# Patient Record
Sex: FEMALE | Race: BLACK OR AFRICAN AMERICAN
[De-identification: names, ages, dates, MRNs, and addresses within clinical notes are randomized per-mention and may not be internally consistent; named-entity substitution may affect disease eponyms.]

---

## 2021-07-31 ENCOUNTER — HOSPITAL ENCOUNTER (EMERGENCY)
Dept: HOSPITAL 5 - ED | Age: 66
Discharge: HOME | End: 2021-07-31
Payer: COMMERCIAL

## 2021-07-31 VITALS — SYSTOLIC BLOOD PRESSURE: 152 MMHG | DIASTOLIC BLOOD PRESSURE: 87 MMHG

## 2021-07-31 DIAGNOSIS — Z90.49: ICD-10-CM

## 2021-07-31 DIAGNOSIS — R50.9: ICD-10-CM

## 2021-07-31 DIAGNOSIS — R42: ICD-10-CM

## 2021-07-31 DIAGNOSIS — R06.02: Primary | ICD-10-CM

## 2021-07-31 DIAGNOSIS — F17.200: ICD-10-CM

## 2021-07-31 DIAGNOSIS — Z79.899: ICD-10-CM

## 2021-07-31 LAB
ALBUMIN SERPL-MCNC: 4.6 G/DL (ref 3.9–5)
ALT SERPL-CCNC: 12 UNITS/L (ref 7–56)
BASOPHILS # (AUTO): 0 K/MM3 (ref 0–0.1)
BASOPHILS NFR BLD AUTO: 0.5 % (ref 0–1.8)
BILIRUB UR QL STRIP: (no result)
BLOOD UR QL VISUAL: (no result)
BUN SERPL-MCNC: 12 MG/DL (ref 7–17)
BUN/CREAT SERPL: 24 %
CALCIUM SERPL-MCNC: 9.6 MG/DL (ref 8.4–10.2)
EOSINOPHIL # BLD AUTO: 0 K/MM3 (ref 0–0.4)
EOSINOPHIL NFR BLD AUTO: 0.6 % (ref 0–4.3)
HCT VFR BLD CALC: 43.9 % (ref 30.3–42.9)
HEMOLYSIS INDEX: 8
HGB BLD-MCNC: 15.1 GM/DL (ref 10.1–14.3)
LYMPHOCYTES # BLD AUTO: 1.6 K/MM3 (ref 1.2–5.4)
LYMPHOCYTES NFR BLD AUTO: 28.9 % (ref 13.4–35)
MCHC RBC AUTO-ENTMCNC: 34 % (ref 30–34)
MCV RBC AUTO: 90 FL (ref 79–97)
MONOCYTES # (AUTO): 0.5 K/MM3 (ref 0–0.8)
MONOCYTES % (AUTO): 8.1 % (ref 0–7.3)
PH UR STRIP: 7 [PH] (ref 5–7)
PLATELET # BLD: 163 K/MM3 (ref 140–440)
PROT UR STRIP-MCNC: (no result) MG/DL
RBC # BLD AUTO: 4.87 M/MM3 (ref 3.65–5.03)
RBC #/AREA URNS HPF: 1 /HPF (ref 0–6)
T4 FREE SERPL-MCNC: 1.53 NG/DL (ref 0.76–1.46)
UROBILINOGEN UR-MCNC: < 2 MG/DL (ref ?–2)
WBC #/AREA URNS HPF: < 1 /HPF (ref 0–6)

## 2021-07-31 PROCEDURE — 70450 CT HEAD/BRAIN W/O DYE: CPT

## 2021-07-31 PROCEDURE — 84439 ASSAY OF FREE THYROXINE: CPT

## 2021-07-31 PROCEDURE — 96360 HYDRATION IV INFUSION INIT: CPT

## 2021-07-31 PROCEDURE — 85025 COMPLETE CBC W/AUTO DIFF WBC: CPT

## 2021-07-31 PROCEDURE — 93005 ELECTROCARDIOGRAM TRACING: CPT

## 2021-07-31 PROCEDURE — 85379 FIBRIN DEGRADATION QUANT: CPT

## 2021-07-31 PROCEDURE — 84484 ASSAY OF TROPONIN QUANT: CPT

## 2021-07-31 PROCEDURE — 80053 COMPREHEN METABOLIC PANEL: CPT

## 2021-07-31 PROCEDURE — 84443 ASSAY THYROID STIM HORMONE: CPT

## 2021-07-31 PROCEDURE — 71046 X-RAY EXAM CHEST 2 VIEWS: CPT

## 2021-07-31 PROCEDURE — 99285 EMERGENCY DEPT VISIT HI MDM: CPT

## 2021-07-31 PROCEDURE — 81001 URINALYSIS AUTO W/SCOPE: CPT

## 2021-07-31 PROCEDURE — 36415 COLL VENOUS BLD VENIPUNCTURE: CPT

## 2021-07-31 NOTE — CAT SCAN REPORT
CT head/brain wo con 



INDICATION / CLINICAL INFORMATION:

Headache, dizziness.



TECHNIQUE:

Axial CT imaging of the brain was obtained without contrast. Coronal and sagittal reformatted imaging
 obtained and reviewed.  All CT scans at this location are performed using CT dose reduction for ALAR
A by means of automated exposure control. 



COMPARISON:

None available.



FINDINGS:

CT imaging of the brain does not demonstrate any intracranial mass, hemorrhage, or midline shift. No 
extra axial fluid collection or suggestion of acute territorial infarction. Ventricular system and ba
silar cisterns are unremarkable. There is benign calcification noted within the basal ganglia bilater
ally. There is mild cerebral and cerebellar atrophy, age appropriate.



The visualized paranasal sinuses and mastoid air cells are well aerated and clear. No calvarial abnor
mality noted.



IMPRESSION:

1. No acute intracranial abnormality. 



Signer Name: Nicky Coates MD 

Signed: 7/31/2021 12:27 PM

Workstation Name: [id number]

## 2021-07-31 NOTE — XRAY REPORT
CHEST 2 VIEWS 



INDICATION / CLINICAL INFORMATION:

Cough.



COMPARISON: 

None available.



FINDINGS:



SUPPORT DEVICES: None.



HEART / MEDIASTINUM: No significant abnormality. 



LUNGS / PLEURA: The lungs are borderline hyperinflated but otherwise grossly clear. No suggestion for
 pneumonia, pleural effusion, or pulmonary edema. No pneumothorax. 



ADDITIONAL FINDINGS: No significant additional findings.



IMPRESSION:

1. No acute pulmonary or pleural disease.



Signer Name: Nicky Coates MD 

Signed: 7/31/2021 10:57 AM

Workstation Name: [id number]

## 2021-07-31 NOTE — EMERGENCY DEPARTMENT REPORT
HPI





- General


Chief Complaint: Weakness


Time Seen by Provider: 21 11:25





- HPI


HPI: 





Room 33








The patient is a 58-year-old female present with chief complaint of shortness of

breath and dizziness.  The patient states for the past 4 days she has had s

hortness of breath and subjective fever.  Patient denies history of cough.  

Patient complains of pain in both feet and a headache.  Patient also states she 

feels dizzy at times.  Patient complains of feeling fatigued but denies chest 

pain/chest pressure/chest tightness





ED Past Medical Hx





- Past Medical History


Previous Medical History?: Yes


Additional medical history: Bronchitis





- Surgical History


Past Surgical History?: Yes


Hx Cholecystectomy: Yes





- Family History


Family history: no significant





- Social History


Smoking Status: Current Every Day Smoker (1/2 pack/day)


Substance Use Type: None





- Medications


Home Medications: 


                                Home Medications











 Medication  Instructions  Recorded  Confirmed  Last Taken  Type


 


Albuterol Mdi (or & Nicu Only) 2 puff IH QID PRN #8.5 gram 21  Unknown Rx





[ProAir HFA Inhaler]     


 


Azithromycin [Zithromax Z-ARTHUR] 0 mg PO DAILY #6 tab 21  Unknown Rx


 


Meclizine [Antivert] 25 mg PO TID PRN #20 tablet 21  Unknown Rx


 


Prednisone [predniSONE 10 mg 10 mg PO .TAPER #1 tab.ds.pk 21  Unknown Rx





(6-Day Pack, 21 Tabs)]     














ED Review of Systems


ROS: 


Stated complaint: FEELING WEAK, DIFFICULT WALKING


Other details as noted in HPI





Constitutional: fever (Subjective), malaise


Eyes: denies: eye pain


ENT: denies: throat pain


Respiratory: shortness of breath.  denies: cough


Cardiovascular: denies: chest pain


Endocrine: no symptoms reported


Gastrointestinal: denies: nausea, vomiting


Genitourinary: denies: dysuria


Musculoskeletal: denies: back pain


Neurological: headache





Physical Exam





- Physical Exam


Vital Signs: 


                                   Vital Signs











  21





  09:06


 


Temperature 98.6 F


 


Pulse Rate 74


 


Respiratory 18





Rate 


 


Blood Pressure 152/87


 


O2 Sat by Pulse 99





Oximetry 











Physical Exam: 





GENERAL: The patient is well-developed well-nourished female sitting in chair 

not appearing to be in acute distress. []


HEENT: Normocephalic.  Atraumatic.  Extraocular motions are intact.  Patient has

 moist mucous membranes.


NECK: Supple.  No meningitic signs are noted.  Trachea midline


CHEST/LUNGS: Clear to auscultation.  There is no respiratory distress noted.


HEART/CARDIOVASCULAR: Regular.  There is no tachycardia.  There is no gallop rub

 or murmur.


ABDOMEN: Abdomen is soft, nontender.  Patient has normal bowel sounds.  There is

 no abdominal distention.


SKIN: There is no rash.  There is no edema.  There is no diaphoresis.


NEURO: The patient is awake, alert, and oriented.  The patient is cooperative.  

The patient has no focal neurologic deficits.  The patient has normal speech.  

Cranial nerves II through XII grossly intact.  GCS 15


MUSCULOSKELETAL:  There is no evidence of acute injury.





ED Course


                                   Vital Signs











  21





  09:06


 


Temperature 98.6 F


 


Pulse Rate 74


 


Respiratory 18





Rate 


 


Blood Pressure 152/87


 


O2 Sat by Pulse 99





Oximetry 














ED Medical Decision Making





- Lab Data


Result diagrams: 


                                 21 10:43





                                 21 10:43





                                Laboratory Tests











  21





  10:43 10:43 10:43


 


WBC  5.6  


 


RBC  4.87  


 


Hgb  15.1 H  


 


Hct  43.9 H  


 


MCV  90  


 


MCH  31  


 


MCHC  34  


 


RDW  13.6  


 


Plt Count  163  


 


Lymph % (Auto)  28.9  


 


Mono % (Auto)  8.1 H  


 


Eos % (Auto)  0.6  


 


Baso % (Auto)  0.5  


 


Lymph # (Auto)  1.6  


 


Mono # (Auto)  0.5  


 


Eos # (Auto)  0.0  


 


Baso # (Auto)  0.0  


 


Seg Neutrophils %  61.9  


 


Seg Neutrophils #  3.5  


 


D-Dimer    < 135.00


 


Sodium   138 


 


Potassium   4.5 


 


Chloride   99.9 


 


Carbon Dioxide   30 


 


Anion Gap   13 


 


BUN   12 


 


Creatinine   0.5 L 


 


Estimated GFR   > 60 


 


BUN/Creatinine Ratio   24 


 


Glucose   114 H 


 


Calcium   9.6 


 


Total Bilirubin   0.40 


 


AST   26 


 


ALT   12 


 


Alkaline Phosphatase   93 


 


Troponin T   < 0.010 


 


Total Protein   7.5 


 


Albumin   4.6 


 


Albumin/Globulin Ratio   1.6 


 


TSH   


 


Free T4   


 


Urine Color   


 


Urine Turbidity   


 


Urine pH   


 


Ur Specific Gravity   


 


Urine Protein   


 


Urine Glucose (UA)   


 


Urine Ketones   


 


Urine Blood   


 


Urine Nitrite   


 


Urine Bilirubin   


 


Urine Urobilinogen   


 


Ur Leukocyte Esterase   


 


Urine WBC (Auto)   


 


Urine RBC (Auto)   














  21





  10:43 12:45


 


WBC  


 


RBC  


 


Hgb  


 


Hct  


 


MCV  


 


MCH  


 


MCHC  


 


RDW  


 


Plt Count  


 


Lymph % (Auto)  


 


Mono % (Auto)  


 


Eos % (Auto)  


 


Baso % (Auto)  


 


Lymph # (Auto)  


 


Mono # (Auto)  


 


Eos # (Auto)  


 


Baso # (Auto)  


 


Seg Neutrophils %  


 


Seg Neutrophils #  


 


D-Dimer  


 


Sodium  


 


Potassium  


 


Chloride  


 


Carbon Dioxide  


 


Anion Gap  


 


BUN  


 


Creatinine  


 


Estimated GFR  


 


BUN/Creatinine Ratio  


 


Glucose  


 


Calcium  


 


Total Bilirubin  


 


AST  


 


ALT  


 


Alkaline Phosphatase  


 


Troponin T  


 


Total Protein  


 


Albumin  


 


Albumin/Globulin Ratio  


 


TSH  0.820 


 


Free T4  1.53 H 


 


Urine Color   Straw


 


Urine Turbidity   Clear


 


Urine pH   7.0


 


Ur Specific Gravity   1.006


 


Urine Protein   <15 mg/dl


 


Urine Glucose (UA)   Neg


 


Urine Ketones   Neg


 


Urine Blood   Neg


 


Urine Nitrite   Neg


 


Urine Bilirubin   Neg


 


Urine Urobilinogen   < 2.0


 


Ur Leukocyte Esterase   Neg


 


Urine WBC (Auto)   < 1.0


 


Urine RBC (Auto)   1.0














- EKG Data


-: EKG Interpreted by Me


EKG shows normal: sinus rhythm


Rate: normal





- EKG Data


When compared to previous EKG there are: previous EKG unavailable


Interpretation: other (No ischemic changes seen)





- Radiology Data


Radiology results: report reviewed (CT head, chest x-ray), image reviewed (CT 

head, chest x-ray)


interpreted by me: 





Chest x-ray-no definite focal infiltrates, no pneumothorax





[facility] [address] [place] [address] Cat

 Scan Report Signed Patient: GABBI TOLENTINO MR#: [id number] : 1955 

Acct:[id number] Age/Sex: 66 / F ADM Date: 21 Loc: ED Attending Dr: 

Ordering Physician: MAHESH HAMMONDS MD Date of Service: 21 Procedure(s): CT 

head/brain wo con Accession Number(s): [id number] cc: MAHESH HAMMONDS MD CT head/brain

 wo con INDICATION / CLINICAL INFORMATION: Headache, dizziness. TECHNIQUE: Axial

 CT imaging of the brain was obtained without contrast. Coronal and sagittal 

reformatted imaging obtained and reviewed. All CT scans at this location are 

performed using CT dose reduction for ALARA by means of automated exposure 

control. COMPARISON: None available. FINDINGS: CT imaging of the brain does not 

demonstrate any intracranial mass, hemorrhage, or midline shift. No extra axial 

fluid collection or suggestion of acute territorial infarction. Ventricular 

system and basilar cisterns are unremarkable. There is benign calcification 

noted within the basal ganglia bilaterally. There is mild cerebral and ce

rebellar atrophy, age appropriate. The visualized paranasal sinuses and mastoid 

air cells are well aerated and clear. No calvarial abnormality noted. 

IMPRESSION: 1. No acute intracranial abnormality. Signer Name: Nicky Coates MD Signed: 2021 12:27 PM Workstation Name: [id number] Transcribed By:  

Dictated By: Nicky Coates MD Electronically Authenticated By: Nicky Coates MD Signed Date/Time: 21 1227 DD/DT: 21 1225 TD/TT: 


Print Cancel





[facility] [address] [place] [address] 

XRay Report Signed Patient: GABBI TOLENTINO MR#: [id number] : 1955 

Acct:[id number] Age/Sex: 66 / F ADM Date: 21 Loc: ED Attending Dr: 

Ordering Physician: LIVIER MARINELLI MD Date of Service: 21 Procedure(s): XR chest 

routine 2V Accession Number(s): [id number] cc: ED MD [facility] Time In Minutes: 

CHEST 2 VIEWS INDICATION / CLINICAL INFORMATION: Cough. COMPARISON: None 

available. FINDINGS: SUPPORT DEVICES: None. HEART / MEDIASTINUM: No significant 

abnormality. LUNGS / PLEURA: The lungs are borderline hyperinflated but 

otherwise grossly clear. No suggestion for pneumonia, pleural effusion, or 

pulmonary edema. No pneumothorax. ADDITIONAL FINDINGS: No significant additional

 findings. IMPRESSION: 1. No acute pulmonary or pleural disease. Signer Name: 

Nicky Coates MD Signed: 2021 10:57 AM Workstation Name: [id number] 

Transcribed By:  Dictated By: Nicky Coates MD Electronically Authent

icated By: Nicky Coates MD Signed Date/Time: 21 105 DD/DT: 

21 1056 TD/TT: 


Print Cancel 











- Differential Diagnosis


Symptomatic anemia, hypothyroidism, PE, COVID-19, COPD,


Critical care attestation.: 


If time is entered above; I have spent that time in minutes in the direct care 

of this critically ill patient, excluding procedure time.








ED Disposition


Clinical Impression: 


 Shortness of breath, Vertigo, Subjective fever





Disposition: DC-01 TO HOME OR SELFCARE


Is pt being admited?: No


Does the pt Need Aspirin: No


Condition: Stable


Instructions:  Shortness of Breath, Adult, Easy-to-Read, Dizziness, Easy-to-Read


Additional Instructions: 


Return to the emergency department should you develop worsening symptoms, 

inability to tolerate food or liquids, high fever or any other concerns


Prescriptions: 


Meclizine [Antivert] 25 mg PO TID PRN #20 tablet


 PRN Reason: Vertigo


Prednisone [predniSONE 10 mg (6-Day Pack, 21 Tabs)] 10 mg PO .TAPER #1 tab.ds.pk


Albuterol Mdi (or & Nicu Only) [ProAir HFA Inhaler] 2 puff IH QID PRN #8.5 gram


 PRN Reason: Shortness Of Breath


Azithromycin [Zithromax Z-ARTHUR] 0 mg PO DAILY #6 tab


Referrals: 


PRIMARY CARE,MD [Referring] - 3-5 Days

## 2021-08-02 NOTE — ELECTROCARDIOGRAPH REPORT
[facility]

                                       

Test Date:    2021               Test Time:    12:23:31

Pat Name:     GABBI TOLENTINO                 Department:   

Patient ID:   [id number]          Room:          

Gender:       F                        Technician:   BEATRIZ

:          1955               Requested By: ROSSY BUTLER

Order Number: [id number]              Reading MD:   Geoff Heredia

                                 Measurements

Intervals                              Axis          

Rate:         74                       P:            80

CO:           146                      QRS:          80

QRSD:         91                       T:            32

QT:           407                                    

QTc:          452                                    

                           Interpretive Statements

Sinus rhythm

No previous ECG available for comparison

Electronically Signed On 2021 10:37:33 EDT by Geoff Heredia

## 2022-08-06 ENCOUNTER — HOSPITAL ENCOUNTER (EMERGENCY)
Dept: HOSPITAL 5 - ED | Age: 67
Discharge: HOME | End: 2022-08-06
Payer: COMMERCIAL

## 2022-08-06 VITALS — DIASTOLIC BLOOD PRESSURE: 86 MMHG | SYSTOLIC BLOOD PRESSURE: 113 MMHG

## 2022-08-06 DIAGNOSIS — Z90.49: ICD-10-CM

## 2022-08-06 DIAGNOSIS — R53.83: Primary | ICD-10-CM

## 2022-08-06 LAB
ALBUMIN SERPL-MCNC: 4.2 G/DL (ref 3.9–5)
ALT SERPL-CCNC: 7 UNITS/L (ref 7–56)
BASOPHILS # (AUTO): 0.1 K/MM3 (ref 0–0.1)
BASOPHILS NFR BLD AUTO: 1.6 % (ref 0–1.8)
BILIRUB UR QL STRIP: (no result)
BLOOD UR QL VISUAL: (no result)
BUN SERPL-MCNC: 15 MG/DL (ref 7–17)
BUN/CREAT SERPL: 21 %
CALCIUM SERPL-MCNC: 9 MG/DL (ref 8.4–10.2)
EOSINOPHIL # BLD AUTO: 0.1 K/MM3 (ref 0–0.4)
EOSINOPHIL NFR BLD AUTO: 1.2 % (ref 0–4.3)
HCT VFR BLD CALC: 41.7 % (ref 30.3–42.9)
HEMOLYSIS INDEX: 31
HGB BLD-MCNC: 14.1 GM/DL (ref 10.1–14.3)
LYMPHOCYTES # BLD AUTO: 1.7 K/MM3 (ref 1.2–5.4)
LYMPHOCYTES NFR BLD AUTO: 37 % (ref 13.4–35)
MCHC RBC AUTO-ENTMCNC: 34 % (ref 30–34)
MCV RBC AUTO: 89 FL (ref 79–97)
MONOCYTES # (AUTO): 0.3 K/MM3 (ref 0–0.8)
MONOCYTES % (AUTO): 7.1 % (ref 0–7.3)
PH UR STRIP: 8 [PH] (ref 5–7)
PLATELET # BLD: 144 K/MM3 (ref 140–440)
PROT UR STRIP-MCNC: (no result) MG/DL
RBC # BLD AUTO: 4.7 M/MM3 (ref 3.65–5.03)
RBC #/AREA URNS HPF: 1 /HPF (ref 0–6)
UROBILINOGEN UR-MCNC: < 2 MG/DL (ref ?–2)
WBC #/AREA URNS HPF: < 1 /HPF (ref 0–6)

## 2022-08-06 PROCEDURE — 99284 EMERGENCY DEPT VISIT MOD MDM: CPT

## 2022-08-06 PROCEDURE — 84443 ASSAY THYROID STIM HORMONE: CPT

## 2022-08-06 PROCEDURE — 85025 COMPLETE CBC W/AUTO DIFF WBC: CPT

## 2022-08-06 PROCEDURE — 84484 ASSAY OF TROPONIN QUANT: CPT

## 2022-08-06 PROCEDURE — 71046 X-RAY EXAM CHEST 2 VIEWS: CPT

## 2022-08-06 PROCEDURE — 81001 URINALYSIS AUTO W/SCOPE: CPT

## 2022-08-06 PROCEDURE — 80053 COMPREHEN METABOLIC PANEL: CPT

## 2022-08-06 PROCEDURE — 36415 COLL VENOUS BLD VENIPUNCTURE: CPT

## 2022-08-06 PROCEDURE — 99283 EMERGENCY DEPT VISIT LOW MDM: CPT

## 2022-08-06 PROCEDURE — 96360 HYDRATION IV INFUSION INIT: CPT

## 2022-08-06 NOTE — EMERGENCY DEPARTMENT REPORT
ED General Adult HPI





- General


Chief complaint: Medical Clearance


Stated complaint: LOW BLOOD PRESSURE


Time Seen by Provider: 08/06/22 13:35


Source: patient


Mode of arrival: Ambulatory


Limitations: No Limitations





- History of Present Illness


Initial comments: 





67-year-old  female with no past medical history presents to the emergency 

department for evaluation of few day history of progressive fatigue.  She states

that she is just not had the energy that she usually has for the last few days 

and when she checked her blood pressure at home this morning it was low.  She 

denies chest pain, shortness of breath, fever, dizziness, nausea, vomiting, and 

diaphoresis.  She denies any sick contacts.


MD Complaint: Fatigue and low blood pressure


-: Gradual, days(s) (3-4)


Severity scale (0 -10): 0


Associated Symptoms: malaise.  denies: confusion, chest pain, diaphoresis, 

fever/chills, headaches, loss of appetite, nausea/vomiting, rash, seizure, 

shortness of breath, syncope, weakness


Treatments Prior to Arrival: none





- Related Data


                                  Previous Rx's











 Medication  Instructions  Recorded  Last Taken  Type


 


Albuterol Mdi (or & Nicu Only) 2 puff IH QID PRN #8.5 gram 07/31/21 Unknown Rx





[ProAir HFA Inhaler]    


 


Azithromycin [Zithromax Z-ARTHUR] 0 mg PO DAILY #6 tab 07/31/21 Unknown Rx


 


Meclizine [Antivert] 25 mg PO TID PRN #20 tablet 07/31/21 Unknown Rx


 


Prednisone [predniSONE 10 mg 10 mg PO .TAPER #1 tab.ds.pk 07/31/21 Unknown Rx





(6-Day Pack, 21 Tabs)]    











                                    Allergies











Allergy/AdvReac Type Severity Reaction Status Date / Time


 


No Known Allergies Allergy   Verified 08/06/22 11:58














ED Review of Systems


ROS: 


Stated complaint: LOW BLOOD PRESSURE


Other details as noted in HPI





Comment: All other systems reviewed and negative


Constitutional: malaise.  denies: chills, fever


ENT: denies: congestion


Respiratory: denies: cough, shortness of breath, SOB with exertion, SOB at rest,

stridor, wheezing


Cardiovascular: denies: chest pain, palpitations, dyspnea on exertion, 

orthopnea, edema, syncope, paroxysmal nocturnal dyspnea


Gastrointestinal: denies: abdominal pain, nausea, vomiting, diarrhea, 

hematemesis, melena, hematochezia


Genitourinary: denies: urgency, dysuria, frequency, hematuria, discharge


Musculoskeletal: denies: back pain


Skin: denies: rash, lesions


Neurological: weakness.  denies: headache, numbness, paresthesias, confusion, 

abnormal gait, vertigo





ED Past Medical Hx





- Past Medical History


Additional medical history: Bronchitis





- Surgical History


Hx Cholecystectomy: Yes





- Social History


Smoking Status: Never Smoker


Substance Use Type: None





- Medications


Home Medications: 


                                Home Medications











 Medication  Instructions  Recorded  Confirmed  Last Taken  Type


 


Albuterol Mdi (or & Nicu Only) 2 puff IH QID PRN #8.5 gram 07/31/21  Unknown Rx





[ProAir HFA Inhaler]     


 


Azithromycin [Zithromax Z-ARTHUR] 0 mg PO DAILY #6 tab 07/31/21  Unknown Rx


 


Meclizine [Antivert] 25 mg PO TID PRN #20 tablet 07/31/21  Unknown Rx


 


Prednisone [predniSONE 10 mg 10 mg PO .TAPER #1 tab.ds.pk 07/31/21  Unknown Rx





(6-Day Pack, 21 Tabs)]     














ED Physical Exam





- General


Limitations: No Limitations


General appearance: alert, in no apparent distress





- Head


Head exam: Present: atraumatic, normocephalic





- Eye


Eye exam: Present: normal appearance.  Absent: conjunctival injection, 

periorbital swelling, periorbital tenderness





- ENT


ENT exam: Present: normal exam, normal orophraynx





- Neck


Neck exam: Present: normal inspection, full ROM.  Absent: tenderness, 

lymphadenopathy





- Respiratory


Respiratory exam: Present: normal lung sounds bilaterally.  Absent: respiratory 

distress, wheezes, rales, rhonchi, stridor, chest wall tenderness





- Cardiovascular


Cardiovascular Exam: Present: regular rate, normal heart sounds





- GI/Abdominal


GI/Abdominal exam: Present: soft, normal bowel sounds.  Absent: distended, 

tenderness, guarding, rebound, rigid





- Extremities Exam


Extremities exam: Present: normal inspection, full ROM, normal capillary refill.

 Absent: tenderness, pedal edema, joint swelling, calf tenderness





- Back Exam


Back exam: Present: normal inspection.  Absent: CVA tenderness (R), CVA 

tenderness (L), vertebral tenderness





- Neurological Exam


Neurological exam: Present: alert, oriented X3, CN II-XII intact, normal gait, 

reflexes normal.  Absent: motor sensory deficit





- Expanded Neurological Exam


  ** Expanded


Patient oriented to: Present: person, place, time


Speech: Present: fluid speech


Cranial nerves: EOM's Intact: Normal, Gag Reflex: Normal, Tongue Deviation: 

Normal, Nystagmus: Normal, Facial Sensation: Normal


Ataxia: Absent: yes


Cerebellar function: Romberg: Normal


Sensory exam: Upper Extremity Light Touch: Normal, Upper Extremity Temperature: 

Normal, Lower Extremity Light Touch: Normal, Lower Extremity Temperature: Normal


Motor strength exam: RUE: 5, LUE: 5, RLE: 5, LLE: 5


Best Eye Response (Nelson): (4) open spontaneously


Best Motor Response (Nelson): (6) obeys commands


Best Verbal Response (Praveen): (5) oriented


Nelson Total: 15





- Psychiatric


Psychiatric exam: Present: normal affect, normal mood





- Skin


Skin exam: Present: warm, dry, intact, normal color





ED Course


                                   Vital Signs











  08/06/22 08/06/22 08/06/22





  11:57 14:15 16:53


 


Temperature 98.2 F 98.5 F 98.4 F


 


Pulse Rate 90 88 75


 


Respiratory 18 16 16





Rate   


 


Blood Pressure  133/86 


 


Blood Pressure 103/74  113/86





[Left]   


 


O2 Sat by Pulse 96 99 100





Oximetry   














- Reevaluation(s)


Reevaluation #1: 





08/06/22 15:55


Patient states that she feels much better.





ED Medical Decision Making





- Lab Data


Result diagrams: 


                                 08/06/22 12:53





                                 08/06/22 12:53





- Radiology Data


Radiology results: report reviewed, image reviewed





Chest x-ray:


 FINDINGS:  


 


 SUPPORT DEVICES: None.  


 


 HEART: Within normal limits.   


 


 LUNGS/PLEURA: No acute air space or interstitial disease.  No pneumothorax.  


 


 ADDITIONAL FINDINGS: None.  


 


 


 


 IMPRESSION:  


 1. No acute findings.  





- Medical Decision Making








67-year-old  female with no past medical history presents to the emergency 

department for evaluation of few day history of progressive fatigue.  She states

 that she is just not had the energy that she usually has for the last few days 

and when she checked her blood pressure at home this morning it was low.  She 

denies chest pain, shortness of breath, fever, dizziness, nausea, vomiting, and 

diaphoresis.  She denies any sick contacts.





Physical exam unremarkable.  Workup unremarkable.  Patient feels better.  She 

will be discharged home to follow up with her pcp for further evaluation and 

management or return to ED as needed.  She verbalized understanding of and 

agreement with plan of care. 


Critical care attestation.: 


If time is entered above; I have spent that time in minutes in the direct care 

of this critically ill patient, excluding procedure time.








ED Disposition


Clinical Impression: 


Fatigue


Qualifiers:


 Fatigue type: unspecified Qualified Code(s): R53.83 - Other fatigue





Disposition: 01 HOME / SELF CARE / HOMELESS


Is pt being admited?: No


Does the pt Need Aspirin: No


Condition: Stable


Instructions:  Health Maintenance After Age 65, Fatigue


Additional Instructions: 


Get plenty of rest.  Increase intake of noncaffeinated fluids.  Follow-up with 

your primary care provider for further evaluation.  Return to the emergency 

department as needed.


Referrals: 


INDRA RICH MD [Staff Physician] - 3-5 Days


Time of Disposition: 15:56

## 2022-08-06 NOTE — XRAY REPORT
CHEST 2 VIEWS 



INDICATION: 

weakness.



COMPARISON: 

7/31/2021



FINDINGS:



SUPPORT DEVICES: None.



HEART: Within normal limits. 



LUNGS/PLEURA: No acute air space or interstitial disease.  No pneumothorax.



ADDITIONAL FINDINGS: None.







IMPRESSION:

1. No acute findings.



Signer Name: Roman Bartlett MD 

Signed: 8/6/2022 12:45 PM

Workstation Name: [id number]